# Patient Record
Sex: FEMALE | Race: WHITE
[De-identification: names, ages, dates, MRNs, and addresses within clinical notes are randomized per-mention and may not be internally consistent; named-entity substitution may affect disease eponyms.]

---

## 2020-05-15 ENCOUNTER — HOSPITAL ENCOUNTER (OUTPATIENT)
Dept: HOSPITAL 62 - RDC | Age: 35
End: 2020-05-15
Attending: NURSE PRACTITIONER
Payer: SELF-PAY

## 2020-05-15 VITALS — SYSTOLIC BLOOD PRESSURE: 100 MMHG | DIASTOLIC BLOOD PRESSURE: 63 MMHG

## 2020-05-15 DIAGNOSIS — Z20.828: Primary | ICD-10-CM

## 2020-05-15 PROCEDURE — 99201: CPT

## 2020-05-15 PROCEDURE — 87635 SARS-COV-2 COVID-19 AMP PRB: CPT

## 2020-05-15 NOTE — ER [facility] ASSESSMENT REPORT
Intake





- In the Last 14 days


Have you traveled outside [place]?: No


Have you been in close contact with someone CONFIRMED: No


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





- Symptoms


Subjective Fever(Felt feverish): No


Chills: No


Muscule Aches: No


Runny Nose: No


Sore Throat: No


Cough (New or worsening chronic cough): Yes


Shortness of breath: No


Nausea or Vomiting: No


Headache: No


Abdominal Pain: No


Diarrhea(3 or more loose stools in last 24 hours): No





- Do you have any of the following


Chronic lung disease: Asthma or emphysema or COPD: No


Cystic Fibrosis: No


Diabetes: No


High Blood Pressure: No


Cardiovascular Disease: No


Chronic Kidney Disease: No


Chronic Liver Disease: No


Chronic blood disorder like Sickle Cell Disease: No


Weak immune system due to disease or medication: No


Neurologic condition that limits movement: No


Developmental delay - Moderate to Severe: No


Recent (within past 2 weeks) or current Pregnancy: No


Morbid Obesity (>100 pounds over ideal weight): No





- Objective


Temperature: 98.3 F


Pulse Rate: 70


Respiratory Rate: 18


Blood Pressure: 100/63


O2 Sat by Pulse Oximetry: 96


Objective: 


Given above, testing performed: 
































If Testing Performed:


Test Specimen Type Sent to











General





- General


Information source: Patient


Notes: 





Patient presents to the [facility] for screening for the coronavirus.





- HPI


Associated symptoms: Nonproductive cough.  denies: Body/muscle aches, Fever, 

Headache





Past Medical History





- General


Information source: Patient





- Social History


Smoking Status: Never Smoker


Occupation: physician


Lives with: Spouse/Significant other





- Medical History


Medical History: Negative





Physical Exam





- Notes


Notes: 





Full physical exam could not be performed due to covid 19 isolation protocols.





Constitutional: Nontoxic appearance, no acute distress


Eyes: Nonicteric, extraocular movements intact, sclera clear


Cardiovascular: No JVD


Respiratory: Nonlabored breathing, no use of accessory muscles, no tachypnea


Muculoskeletal: Moves all extremities well


Skin: Normal color


Neuro: Awake alert oriented, normal speech


Psych: Normal mood and affect





Diagnostic Results


Laboratory Results: 


The patient was evaluated during the global Covid 19 pandemic, and that 

diagnosis was suspected/considered upon their initial presentation.  Their 

evaluation, treatment and testing was consistent with current guidelines for 

patients who present with complaints or symptoms that may be related to Covid 

19.





Patient presents with upper respiratory symptoms worrisome for possible Covid 

19.  Patient does not have emergency worrying symptoms such as difficulty 

breathing, shortness of breath, chest pain, pressure, confusion or cyanosis.  

Patient appears suitable for discharge as they are not of an advanced age, do 

not have any chronic medical conditions such as diabetes, CAD, immune deficiency

, chronic lung disease or chronic kidney disease.  Patient's vital signs are 

stable and patient is nontoxic in appearance.  





Patient Education/Counseling


Counseling/Education: 





Patient was provided with discharge information including:





As a person under investigation for Covid 19, the [facility] of 

Health and Human Services, division of public health advises you to adhere to 

the following guidance until your test results are reported to you.  If your 

test result is positive, you will receive additional information from your 

provider and your local health department at that time.





Remain at home until you are cleared by the health provider or public health 

authorities.





Keep a log of visitors to your home, notify any visitors to your home of your 

isolation status.





If you plan to move to a new address or leave the [facility], notify the local 

health department in your County.





Call your doctor or seek care if you have an urgent medical need.  Before 

seeking medical care, call ahead to get instructions from the provider before 

arriving at the medical office clinic or hospital.  Notify them that you are 

being tested for the virus that causes Covid 19 so that arrangements can be 

made, as necessary, to prevent transmission to others in the healthcare setting.

 Next, notify the local health department in your county.





If a medical emergency arises and you need to call 911, inform the first 

responders that you are being tested for the virus that causes Covid 19.  Next, 

notify the local health department in your county.





[facility]DC Discharge





- Discharge


Clinical Impression: 


 COVID-19 screening





Condition: Stable


Disposition: Home; Selfcare

## 2020-12-21 ENCOUNTER — HOSPITAL ENCOUNTER (OUTPATIENT)
Dept: HOSPITAL 62 - EMPHEALTH | Age: 35
End: 2020-12-21
Attending: INTERNAL MEDICINE
Payer: SELF-PAY

## 2020-12-21 DIAGNOSIS — Z23: Primary | ICD-10-CM

## 2020-12-21 PROCEDURE — 91300: CPT

## 2020-12-24 ENCOUNTER — HOSPITAL ENCOUNTER (EMERGENCY)
Dept: HOSPITAL 62 - ER | Age: 35
Discharge: HOME | End: 2020-12-24
Payer: COMMERCIAL

## 2020-12-24 VITALS — SYSTOLIC BLOOD PRESSURE: 106 MMHG | DIASTOLIC BLOOD PRESSURE: 69 MMHG

## 2020-12-24 DIAGNOSIS — S61.232A: Primary | ICD-10-CM
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
## 2020-12-24 PROCEDURE — 99283 EMERGENCY DEPT VISIT LOW MDM: CPT

## 2020-12-24 NOTE — ER DOCUMENT REPORT
HPI





- HPI


Time Seen by Provider: 12/24/20 02:03


Notes: 





Otherwise healthy 35-year-old female presenting to the emergency department 

after sustaining a needlestick injury.  Patient works as AN emergency department

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 

was stuck with a hollow needle that was just used to anesthetize the patient.  

She reports there was immediate bleeding at the site of her needle stick.  She 

reports the needlestick occurred to her right third digit.








- ROS


Systems Reviewed and Negative: Yes All other systems reviewed and negative





- DERM


Skin Problems: Puncture Wound





Past Medical History





- General


Information source: Patient





- Social History


Smoking Status: Never Smoker


Frequency of alcohol use: None


Drug Abuse: None


Family History: None





- Medical History


Medical History: Negative


Surgical Hx: Negative





- Immunizations


Immunizations up to date: Yes





Vertical Provider Document





- CONSTITUTIONAL


Notes: 





PHYSICAL EXAMINATION:





GENERAL: Well-appearing, well-nourished and in no acute distress.





HEAD: Atraumatic, normocephalic.





EYES: Pupils equal round extraocular movements intact,  conjunctiva are normal.





ENT: Nares patent





NECK: Normal range of motion





LUNGS: No respiratory distress





Musculoskeletal: Normal range of motion





NEUROLOGICAL:  Normal speech, normal gait. 





PSYCH: Normal mood, normal affect.





SKIN: Puncture wound right third digit over the pad of the finger.  Initially 

bleeding, no bleeding now.





Course





- Re-evaluation


Re-evalutation: 





Patient seen in the emergency department after sustaining a workplace 

contaminated needlestick.  HIV prophylactic medications ordered per patient 

request.  Patient understands risks and benefits with these medications.  

Baseline labs have been sent.





- Laboratory Results


Critical Laboratory Results Reviewed: No Critical Results





- Radiology Results


Critical Radiology Results Reviewed: No Critical Results





Discharge





- Discharge


Clinical Impression: 


 Needle stick injury





Condition: Stable


Disposition: HOME, SELF-CARE


Additional Instructions: 


Contaminated Needle-Stick





     You have been "stuck" with a contaminated needle. Most blood-contaminated 

needle-sticks do NOT transmit any disease. However, we must be cautious. Many 

viral infections can be transmitted by a needle-stick, including several types 

of hepatitis and AIDS.


     If the source of the blood is known, that person can be tested for 

hepatitis and AIDS virus. Hepatitis B immune globulin, or Hepatitis B 

immunization, can be given if the person has hepatitis B.  If you've had the 

Hepatitis B immunization series, you don't need the shot.


     Human serum immune globulin is sometimes given to prevent transmission of 

other viral diseases.  The injection is usually repeated in 30 days.


     The risk of transmitting HIV infection by a single needlestick is small. 

Even with a bloody needle from a patient known to have HIV infection, the chance

of you getting infected is about one in a thousand. But if you do catch the HIV 

virus, it's a chronic and ultimately fatal illness.


     Patients with high risk exposures should consider preventive treatment. 

Zidovudine (ZDV) treatment, or a combination of anti-HIV drugs can reduce the 

chance of HIV infection significantly. Treatment is usually continued for 4 

weeks.


     HIV testing can be performed initially, then repeated in 6 to 8 weeks, 3 

months, six months, and one year.


Prescriptions: 


Raltegravir Potassium [Isentress 400 mg Tablet] 400 mg PO BID 28 Days #56 tablet


Emtricitabine/Tenofovir [Truvada Tablet] 1 each PO DAILY #28 tablet


Ondansetron [Zofran Odt 4 mg Tablet] 1 - 2 tab PO Q4H PRN #30 tab.rapdis


 PRN Reason: For Nausea/Vomiting


Referrals: 


LOCALMD,NO [NO LOCAL MD] - Follow up as needed

## 2021-01-11 ENCOUNTER — HOSPITAL ENCOUNTER (OUTPATIENT)
Dept: HOSPITAL 62 - EMPHEALTH | Age: 36
End: 2021-01-11
Attending: INTERNAL MEDICINE
Payer: SELF-PAY

## 2021-01-11 DIAGNOSIS — Z23: Primary | ICD-10-CM

## 2021-01-11 PROCEDURE — 91300: CPT
